# Patient Record
Sex: FEMALE | Race: WHITE | NOT HISPANIC OR LATINO | URBAN - METROPOLITAN AREA
[De-identification: names, ages, dates, MRNs, and addresses within clinical notes are randomized per-mention and may not be internally consistent; named-entity substitution may affect disease eponyms.]

---

## 2020-01-11 ENCOUNTER — IMPORTED ENCOUNTER (OUTPATIENT)
Dept: URBAN - METROPOLITAN AREA CLINIC 1 | Facility: CLINIC | Age: 30
End: 2020-01-11

## 2020-01-11 PROBLEM — H52.13: Noted: 2020-01-11

## 2020-01-11 PROBLEM — H44.23: Noted: 2020-01-11

## 2020-01-11 PROCEDURE — S0620 ROUTINE OPHTHALMOLOGICAL EXA: HCPCS

## 2020-01-11 NOTE — PATIENT DISCUSSION
1. Myopia- Rx was given for correction if indicated and requested. 2. H/o tear 10 years ago CR scaring OS centrally pt states she previous laser to the retina OU. Will dilate yearly due to high myopia and scarring. Will order CTL trials today. Return for an appointment in 1 week CC with Dr. Chaitanya Saldivar.

## 2020-02-07 ENCOUNTER — IMPORTED ENCOUNTER (OUTPATIENT)
Dept: URBAN - METROPOLITAN AREA CLINIC 1 | Facility: CLINIC | Age: 30
End: 2020-02-07

## 2020-02-07 NOTE — PATIENT DISCUSSION
CC today -- Good fit comfort and VA. CTLrx finalized today. Return for an appointment in 1 year 40/cc with Dr. Marco Antonio López.

## 2020-04-30 ENCOUNTER — IMPORTED ENCOUNTER (OUTPATIENT)
Dept: URBAN - METROPOLITAN AREA CLINIC 1 | Facility: CLINIC | Age: 30
End: 2020-04-30

## 2020-04-30 PROBLEM — H53.9: Noted: 2020-04-30

## 2020-04-30 PROCEDURE — 92012 INTRM OPH EXAM EST PATIENT: CPT

## 2020-04-30 NOTE — PATIENT DISCUSSION
1.  Visual Disturbance OS (H/o laser in peripheral retina from 7:00-10:00) -- No holes tears or detachments noted 360 on dilated exam. Patient reassured. RD precautions. Return for an appointment as scheduled with Dr. Alberta Watters.

## 2021-02-12 ENCOUNTER — IMPORTED ENCOUNTER (OUTPATIENT)
Dept: URBAN - METROPOLITAN AREA CLINIC 1 | Facility: CLINIC | Age: 31
End: 2021-02-12

## 2021-02-12 ENCOUNTER — PREPPED CHART (OUTPATIENT)
Dept: URBAN - METROPOLITAN AREA CLINIC 1 | Facility: CLINIC | Age: 31
End: 2021-02-12

## 2021-02-12 PROBLEM — H52.223: Noted: 2021-02-12

## 2021-02-12 PROBLEM — H44.23: Noted: 2021-02-12

## 2021-02-12 PROCEDURE — S0621 ROUTINE OPHTHALMOLOGICAL EXA: HCPCS

## 2021-02-12 NOTE — PATIENT DISCUSSION
1.  High Myopia w/ Astigmatism OU -- Rx was given for correction if indicated and requested. 2. H/o Retinal Tear 10 years ago. CR scaring OS centrally. (H/o laser in peripheral retina from 7:00-10:00). Will dilate yearly due to high myopia and scarring. RD Precautions. Finalized CTL Rx and given to patient ([facility]). Return for an appointment in 1 year 36 with Dr. Breana Hoyos.

## 2022-03-03 ASSESSMENT — TONOMETRY
OD_IOP_MMHG: 16
OS_IOP_MMHG: 16

## 2022-03-03 ASSESSMENT — VISUAL ACUITY
OS_CC: 20/25
OD_CC: 20/25

## 2022-04-02 ASSESSMENT — VISUAL ACUITY
OD_SC: 20/25
OS_SC: 20/20
OD_SC: 20/20
OD_SC: 20/25
OS_SC: 20/25
OS_SC: 20/20
OD_SC: 20/30+1
OS_SC: 20/20-2

## 2022-04-02 ASSESSMENT — TONOMETRY
OS_IOP_MMHG: 15
OD_IOP_MMHG: 16
OS_IOP_MMHG: 16
OD_IOP_MMHG: 16
OS_IOP_MMHG: 16